# Patient Record
Sex: MALE | Race: WHITE | Employment: FULL TIME | URBAN - METROPOLITAN AREA
[De-identification: names, ages, dates, MRNs, and addresses within clinical notes are randomized per-mention and may not be internally consistent; named-entity substitution may affect disease eponyms.]

---

## 2023-06-20 ENCOUNTER — OFFICE VISIT (OUTPATIENT)
Dept: PRIMARY CARE CLINIC | Age: 33
End: 2023-06-20
Payer: COMMERCIAL

## 2023-06-20 VITALS
BODY MASS INDEX: 32.73 KG/M2 | OXYGEN SATURATION: 98 % | WEIGHT: 221 LBS | DIASTOLIC BLOOD PRESSURE: 72 MMHG | HEART RATE: 59 BPM | SYSTOLIC BLOOD PRESSURE: 118 MMHG | TEMPERATURE: 96.8 F | HEIGHT: 69 IN

## 2023-06-20 DIAGNOSIS — R00.1 BRADYCARDIA WITH 41-50 BEATS PER MINUTE: ICD-10-CM

## 2023-06-20 DIAGNOSIS — Z11.4 SCREENING FOR HIV WITHOUT PRESENCE OF RISK FACTORS: ICD-10-CM

## 2023-06-20 DIAGNOSIS — R42 LIGHTHEADEDNESS: ICD-10-CM

## 2023-06-20 DIAGNOSIS — Z00.00 ROUTINE ADULT HEALTH MAINTENANCE: Primary | ICD-10-CM

## 2023-06-20 DIAGNOSIS — Z11.59 NEED FOR HEPATITIS C SCREENING TEST: ICD-10-CM

## 2023-06-20 PROCEDURE — 99385 PREV VISIT NEW AGE 18-39: CPT | Performed by: STUDENT IN AN ORGANIZED HEALTH CARE EDUCATION/TRAINING PROGRAM

## 2023-06-20 PROCEDURE — 93000 ELECTROCARDIOGRAM COMPLETE: CPT | Performed by: STUDENT IN AN ORGANIZED HEALTH CARE EDUCATION/TRAINING PROGRAM

## 2023-06-20 SDOH — ECONOMIC STABILITY: FOOD INSECURITY: WITHIN THE PAST 12 MONTHS, THE FOOD YOU BOUGHT JUST DIDN'T LAST AND YOU DIDN'T HAVE MONEY TO GET MORE.: NEVER TRUE

## 2023-06-20 SDOH — ECONOMIC STABILITY: INCOME INSECURITY: HOW HARD IS IT FOR YOU TO PAY FOR THE VERY BASICS LIKE FOOD, HOUSING, MEDICAL CARE, AND HEATING?: NOT HARD AT ALL

## 2023-06-20 SDOH — ECONOMIC STABILITY: HOUSING INSECURITY
IN THE LAST 12 MONTHS, WAS THERE A TIME WHEN YOU DID NOT HAVE A STEADY PLACE TO SLEEP OR SLEPT IN A SHELTER (INCLUDING NOW)?: NO

## 2023-06-20 SDOH — HEALTH STABILITY: PHYSICAL HEALTH: ON AVERAGE, HOW MANY MINUTES DO YOU ENGAGE IN EXERCISE AT THIS LEVEL?: 60 MIN

## 2023-06-20 SDOH — HEALTH STABILITY: PHYSICAL HEALTH: ON AVERAGE, HOW MANY DAYS PER WEEK DO YOU ENGAGE IN MODERATE TO STRENUOUS EXERCISE (LIKE A BRISK WALK)?: 6 DAYS

## 2023-06-20 SDOH — ECONOMIC STABILITY: FOOD INSECURITY: WITHIN THE PAST 12 MONTHS, YOU WORRIED THAT YOUR FOOD WOULD RUN OUT BEFORE YOU GOT MONEY TO BUY MORE.: NEVER TRUE

## 2023-06-20 ASSESSMENT — PATIENT HEALTH QUESTIONNAIRE - PHQ9
2. FEELING DOWN, DEPRESSED OR HOPELESS: 0
SUM OF ALL RESPONSES TO PHQ QUESTIONS 1-9: 0
SUM OF ALL RESPONSES TO PHQ9 QUESTIONS 1 & 2: 0
1. LITTLE INTEREST OR PLEASURE IN DOING THINGS: 0

## 2023-06-20 NOTE — PATIENT INSTRUCTIONS
Have labwork done, fast for 10 hours beforehand, you may have black coffee or water only. Our clinic will contact you when results are made available. EKG appeared normal without any signs of blockages or abnormal rhythms, but showed bradycardia, Cardiology referral placed    Once lab work above is completed, if there is no cause found, we can possibly refer to cardiology. We do have a cardiologist who comes to this location.

## 2023-06-20 NOTE — PROGRESS NOTES
6/20/2023        Brandon Bee 1990 is a 29 y.o. male who presents today with:  Chief Complaint   Patient presents with    New Patient    Establish Care    Dizziness     X3days        HPI:   Holger Bachelor presents today to establish care. He has no significant past medical history. He has been experiencing lightheadedness for the past few days. She states he has not passed out however he has felt very lightheaded. He denies any dizziness such as worsening or feeling off balance. He denies any tinnitus, nausea, vomiting, ear pain. He does not check his blood pressure at home. He denies any chest pain, palpitations, or any other symptoms today. He stays well-hydrated and works out frequently. He denies any changes in his workout regimen or diet. He denies any new supplements or OTC products. Assessment & Plan   1. Routine adult health maintenance  -     Hemoglobin A1C; Future  -     TSH with Reflex; Future  -     Lipid, Fasting; Future  2. Screening for HIV without presence of risk factors  -     HIV Screen; Future  3. Need for hepatitis C screening test  -     Hepatitis C Antibody; Future  4. Lightheadedness  -     Comprehensive Metabolic Panel; Future  -     CBC with Auto Differential; Future  -     Vitamin D 25 Hydroxy; Future  -     Vitamin B12; Future  -     EKG 12 Lead - Clinic Performed; Future  5. Bradycardia with 41-50 beats per minute  -     Ambulatory referral to Cardiology     There are no discontinued medications. Return in about 6 months (around 12/20/2023). Labs, less likely vertigo, possible dehydration but with EKG showing low HR  Cardiac referral    Objective  Allergies   Allergen Reactions    Penicillins Hives     No current outpatient medications on file. No current facility-administered medications for this visit. PMH, Surgical Hx, Family Hx, and Social Hx reviewed and updated. Health Maintenance reviewed.     Vitals:    06/20/23 1722   BP: 118/72   Pulse: 59   Temp:

## 2023-07-03 ENCOUNTER — OFFICE VISIT (OUTPATIENT)
Dept: CARDIOLOGY CLINIC | Age: 33
End: 2023-07-03
Payer: COMMERCIAL

## 2023-07-03 VITALS
BODY MASS INDEX: 33.18 KG/M2 | SYSTOLIC BLOOD PRESSURE: 116 MMHG | WEIGHT: 224 LBS | RESPIRATION RATE: 14 BRPM | HEART RATE: 64 BPM | HEIGHT: 69 IN | DIASTOLIC BLOOD PRESSURE: 62 MMHG | OXYGEN SATURATION: 99 %

## 2023-07-03 DIAGNOSIS — R42 DIZZINESS: Primary | ICD-10-CM

## 2023-07-03 DIAGNOSIS — R00.1 BRADYCARDIA: ICD-10-CM

## 2023-07-03 PROCEDURE — 99203 OFFICE O/P NEW LOW 30 MIN: CPT | Performed by: INTERNAL MEDICINE

## 2023-07-03 PROCEDURE — G8417 CALC BMI ABV UP PARAM F/U: HCPCS | Performed by: INTERNAL MEDICINE

## 2023-07-03 PROCEDURE — G8427 DOCREV CUR MEDS BY ELIG CLIN: HCPCS | Performed by: INTERNAL MEDICINE

## 2023-07-03 PROCEDURE — 1036F TOBACCO NON-USER: CPT | Performed by: INTERNAL MEDICINE

## 2023-07-03 ASSESSMENT — ENCOUNTER SYMPTOMS
CHEST TIGHTNESS: 0
ABDOMINAL PAIN: 0
COLOR CHANGE: 0
EYE REDNESS: 0
NAUSEA: 0
SHORTNESS OF BREATH: 0
VOMITING: 0
COUGH: 0
CONSTIPATION: 0
DIARRHEA: 0
RHINORRHEA: 0
WHEEZING: 0
APNEA: 0

## 2023-07-03 NOTE — PROGRESS NOTES
Chief Complaint   Patient presents with    New Patient     Dr Fay Cesar ref for Bradycardia. Patient presents for initial medical evaluation. Patient is followed on a regular basis by Dr. Memo Huertas MD.       Tobacco abuse, chews tobacco  =================  7/3/23  First clinic visit eval for bradycardia  2 weeks ago pt was feeling light headed   Patient works out about 5 times per week  Patient does intervals of cardio exercises and weightlifting  Denies chest pain or shortness of breath  But lately he has been feeling at times lightheaded. Patient does not know if this could be psychological    There is no problem list on file for this patient. Past Surgical History:   Procedure Laterality Date    ANKLE SURGERY         Social History     Socioeconomic History    Marital status: Single   Tobacco Use    Smoking status: Never    Smokeless tobacco: Never   Vaping Use    Vaping Use: Never used   Substance and Sexual Activity    Alcohol use: Never    Drug use: Never     Social Determinants of Health     Financial Resource Strain: Low Risk     Difficulty of Paying Living Expenses: Not hard at all   Food Insecurity: No Food Insecurity    Worried About Running Out of Food in the Last Year: Never true    Ran Out of Food in the Last Year: Never true   Transportation Needs: Unknown    Lack of Transportation (Non-Medical): No   Physical Activity: Sufficiently Active    Days of Exercise per Week: 6 days    Minutes of Exercise per Session: 60 min   Intimate Partner Violence: Not At Risk    Fear of Current or Ex-Partner: No    Emotionally Abused: No    Physically Abused: No    Sexually Abused: No   Housing Stability: Unknown    Unstable Housing in the Last Year: No       Family History   Problem Relation Age of Onset    Rheum Arthritis Sister        No current outpatient medications on file. No current facility-administered medications for this visit.        Penicillins    Review of Systems:  Review of

## 2023-09-07 ENCOUNTER — TELEPHONE (OUTPATIENT)
Dept: CARDIOLOGY CLINIC | Age: 33
End: 2023-09-07

## 2023-09-07 NOTE — TELEPHONE ENCOUNTER
Appointment canceled for Jayesh Seat ([id number])   Visit Type: OFFICE VISIT   Date        Time      Length    Provider                  Department   9/11/2023    8:30 AM  15 mins. Dr. Rosevelt Phoenix, MD     [address]      Reason for Cancellation: Other     Appointment Scheduled  (Newest Message First)  Jayesh Seat \"Rakesh\"  Patient Appointment Cancel Request Pool 24 minutes ago (10:23 AM)     BB  Appointment canceled for Jayesh Seat ([id number])  Visit Type: OFFICE VISIT  Date        Time      Length    Provider                  Department  9/11/2023    8:30 AM  15 mins. Dr. Rosevelt Phoenix, MD     [address]     Reason for Cancellation: Other       Batch Job User Deepti  Jayesh Seat 2 months ago     Mychart, Processor  Jayesh Seat \"Rakesh\" 2 months ago     PM  Appointment Information:      Visit Type: Office Visit          Date: 9/11/2023                  Dept: [facility]                  Provider: Rosevelt Phoenix                  Time: 8:30 AM                  Length: 15 min     Appt Status: Scheduled        Appt Instructions:      Please complete digital registration via the EPIS       This EPIS message has not been read.

## 2025-07-05 ENCOUNTER — OFFICE VISIT (OUTPATIENT)
Dept: URGENT CARE | Age: 35
End: 2025-07-05
Payer: COMMERCIAL

## 2025-07-05 VITALS
DIASTOLIC BLOOD PRESSURE: 87 MMHG | WEIGHT: 200 LBS | OXYGEN SATURATION: 99 % | HEART RATE: 95 BPM | TEMPERATURE: 98 F | SYSTOLIC BLOOD PRESSURE: 134 MMHG | BODY MASS INDEX: 28.63 KG/M2 | HEIGHT: 70 IN

## 2025-07-05 DIAGNOSIS — H10.31 ACUTE CONJUNCTIVITIS OF RIGHT EYE, UNSPECIFIED ACUTE CONJUNCTIVITIS TYPE: ICD-10-CM

## 2025-07-05 DIAGNOSIS — J01.10 ACUTE NON-RECURRENT FRONTAL SINUSITIS: Primary | ICD-10-CM

## 2025-07-05 RX ORDER — AZITHROMYCIN 250 MG/1
TABLET, FILM COATED ORAL
Qty: 6 TABLET | Refills: 0 | Status: SHIPPED | OUTPATIENT
Start: 2025-07-05 | End: 2025-07-10

## 2025-07-05 RX ORDER — CIPROFLOXACIN HYDROCHLORIDE 3 MG/ML
1 SOLUTION/ DROPS OPHTHALMIC
Qty: 5 ML | Refills: 0 | Status: SHIPPED | OUTPATIENT
Start: 2025-07-05 | End: 2025-07-15

## 2025-07-05 ASSESSMENT — ENCOUNTER SYMPTOMS
NEUROLOGICAL NEGATIVE: 1
PALPITATIONS: 0
EYE DISCHARGE: 1
SINUS PRESSURE: 1
SINUS PAIN: 1
MUSCULOSKELETAL NEGATIVE: 1
CONSTITUTIONAL NEGATIVE: 1
ENDOCRINE NEGATIVE: 1
PSYCHIATRIC NEGATIVE: 1
RESPIRATORY NEGATIVE: 1
GASTROINTESTINAL NEGATIVE: 1
HEMATOLOGIC/LYMPHATIC NEGATIVE: 1
EYE REDNESS: 1
ALLERGIC/IMMUNOLOGIC NEGATIVE: 1
EYE ITCHING: 1

## 2025-07-05 NOTE — PROGRESS NOTES
"Subjective   Patient ID: Hugh Lin is a 34 y.o. male. They present today with a chief complaint of Sinusitis (7/1 sx started. Lots of facial pressure. Rt eye has been having crust in his eye. ).    History of Present Illness  HPI    Pt presents to urgent care with c/o    congestion, sinus pain and pressure, right eye discharge.  Reports congestion x 1 week.  Reports eye symptoms for the past 1 to 2 days.  Denies visual changes.  Pt denies CP, SOB, palpitations, fevers, abd pain, n/v/d, sick contacts, recent travel.        Past Medical History  Allergies as of 07/05/2025 - Reviewed 07/05/2025   Allergen Reaction Noted    Penicillins Hives and Unknown 06/20/2023       Prescriptions Prior to Admission[1]     Medical History[2]    Surgical History[3]     reports that he has never smoked. He has never used smokeless tobacco.    Review of Systems  Review of Systems   Constitutional: Negative.    HENT:  Positive for congestion, sinus pressure and sinus pain.    Eyes:  Positive for discharge, redness and itching.   Respiratory: Negative.     Cardiovascular:  Negative for chest pain and palpitations.   Gastrointestinal: Negative.    Endocrine: Negative.    Genitourinary: Negative.    Musculoskeletal: Negative.    Skin: Negative.    Allergic/Immunologic: Negative.    Neurological: Negative.    Hematological: Negative.    Psychiatric/Behavioral: Negative.     All other systems reviewed and are negative.                                 Objective    Vitals:    07/05/25 1028   BP: 134/87   Pulse: 95   Temp: 36.7 °C (98 °F)   SpO2: 99%   Weight: 90.7 kg (200 lb)   Height: 1.778 m (5' 10\")     No LMP for male patient.    Physical Exam  Vitals and nursing note reviewed.   Constitutional:       General: He is not in acute distress.     Appearance: Normal appearance. He is not ill-appearing or toxic-appearing.   HENT:      Head: Atraumatic.      Nose: Congestion present.      Mouth/Throat:      Mouth: Mucous membranes are moist. "      Pharynx: Oropharynx is clear.   Eyes:      General:         Right eye: Discharge present.      Extraocular Movements: Extraocular movements intact.      Conjunctiva/sclera: Conjunctivae normal.      Pupils: Pupils are equal, round, and reactive to light.   Cardiovascular:      Rate and Rhythm: Normal rate.   Pulmonary:      Effort: Pulmonary effort is normal.   Skin:     General: Skin is warm and dry.   Neurological:      General: No focal deficit present.      Mental Status: He is alert and oriented to person, place, and time.   Psychiatric:         Mood and Affect: Mood normal.         Behavior: Behavior normal.         Thought Content: Thought content normal.         Procedures    Point of Care Test & Imaging Results from this visit  No results found for this visit on 07/05/25.   Imaging  No results found.    Cardiology, Vascular, and Other Imaging  No other imaging results found for the past 2 days      Diagnostic study results (if any) were reviewed by DANIELA Madrigal.    Assessment/Plan   Allergies, medications, history, and pertinent labs/EKGs/Imaging reviewed by DANIELA Madrigal.     Medical Decision Making  Urgent Care Course: Pt presents to the  with rhinorrhea, cough, sinus congestion.  Patient is afebrile, hemodynamically stable.  Patient denies fever, n/v, cp, sob, ab pain, dysuria, and diarrhea.  Low suspicion for pneumonia/bronchitis given that patient's lungs are clear to auscultation bilaterally.  Given longevity of symptoms, will treat patient for sinusitis with  Zithromax.  Patient given sinusitis and pneumonia warning signs, prescriptions and will f/u with pcp.   Will also treat for presumed right eye conjunctivitis with cipro eyedrops.  Independent Hx provided by: Patient  Social determinant that may affects healthcare: Pharmacy closed  Pt's case/impression summarized and discussed with: Patient  Likely Dx given clinical picture: Sinusitis   Although not an exhaustive  list of Differential Diagnosis (though considered), patient's HPI, PE, and other findings are not suggestive of: Sinusitis, URI, bronchitis, pneumonia, Covid-19, influenza, asthma exacerbation   Patient at time of discharge was clinically well-appearing and HDS for outpatient management. The patient and/or family was given the opportunity to ask questions prior to discharge, understood my verbal discussion of the plans for treatment, expected course, indications to return to ED, and the need for timely follow up as directed.    Condition: Stable      Orders and Diagnoses  Diagnoses and all orders for this visit:  Acute non-recurrent frontal sinusitis  -     azithromycin (Zithromax) 250 mg tablet; Take 2 tabs (500 mg) by mouth today, than 1 daily for 4 days.  Acute conjunctivitis of right eye, unspecified acute conjunctivitis type  -     ciprofloxacin (Ciloxan) 0.3 % ophthalmic solution; Administer 1 drop into the right eye every 2 hours for 10 days.      Medical Admin Record      Patient disposition: Home    Electronically signed by DANIELA Madrigal  11:18 AM           [1] (Not in a hospital admission)   [2] No past medical history on file.  [3] No past surgical history on file.